# Patient Record
Sex: FEMALE | Race: WHITE | Employment: OTHER
[De-identification: names, ages, dates, MRNs, and addresses within clinical notes are randomized per-mention and may not be internally consistent; named-entity substitution may affect disease eponyms.]

---

## 2018-10-28 ENCOUNTER — HEALTH MAINTENANCE LETTER (OUTPATIENT)
Age: 63
End: 2018-10-28

## 2020-02-05 ENCOUNTER — HOSPITAL ENCOUNTER (EMERGENCY)
Facility: CLINIC | Age: 65
Discharge: HOME | End: 2020-02-05
Attending: EMERGENCY MEDICINE | Admitting: EMERGENCY MEDICINE
Payer: MEDICAID

## 2020-02-05 VITALS — OXYGEN SATURATION: 99 % | SYSTOLIC BLOOD PRESSURE: 140 MMHG | DIASTOLIC BLOOD PRESSURE: 88 MMHG | HEART RATE: 58 BPM

## 2020-02-05 DIAGNOSIS — N63.0 BREAST MASS: ICD-10-CM

## 2020-02-05 PROCEDURE — 99282 EMERGENCY DEPT VISIT SF MDM: CPT

## 2020-02-05 ASSESSMENT — ENCOUNTER SYMPTOMS: FEVER: 0

## 2020-02-05 NOTE — ED AVS SNAPSHOT
[facility] Emergency Department  [address]  [facility] [address]  Phone:  [phone]  Fax:  [phone]                                    Tricia Mcdermott   MRN: [id number]    Department:  [facility] Emergency Department   Date of Visit:  2/5/2020           After Visit Summary Signature Page    I have received my discharge instructions, and my questions have been answered. I have discussed any challenges I see with this plan with the nurse or doctor.    ..........................................................................................................................................  Patient/Patient Representative Signature      ..........................................................................................................................................  Patient Representative Print Name and Relationship to Patient    ..................................................               ................................................  Date                                   Time    ..........................................................................................................................................  Reviewed by Signature/Title    ...................................................              ..............................................  Date                                               Time          22EPIC Rev 08/18

## 2020-02-05 NOTE — CONSULTS
See ED note of 2/5    Indira Morgan RN, LICSW, CCM  RN Care Coordinator  [facility], Emergency Department  Phone  [phone]

## 2020-02-05 NOTE — ED NOTES
Care Coordinator - Discharge Planning    Admission Date/Time:  2/5/2020  Attending MD:  Magdalene Salazar MD     Data  Date of initial CC assessment:  2/5  Chart reviewed, discussed with interdisciplinary team.   Patient was admitted for:   1. Breast mass         Assessment   Concerns with insurance coverage for discharge needs: no insurance coverage.  Current Living Situation: Patient lives with spouse.  Support System: Supportive  Services Involved: none  Transportation at Discharge: Family or friend will provide    Barriers to Discharge: none identified      Coordination of Care and Referrals:  Pt needing screening for breast mass.   Uninsured and without primary care.     Contact made with the ERVIN program at the [place] [facility] ([phone]).  Pt qualifies to receive initial visit with PCP and subsequent eval/tx as indicated.    Pt and spouse were educated re: the ERVIN program and have elected to initiate care.        Plan  Anticipated Discharge Date:  2/5  Anticipated Discharge Plan:  Appointment made with a provider at the [facility] in [place], [place] ([phone]) for today at 1630 to initiate care through the ERVIN program.  Information provided to pt/SO verbally and on AVS.      Indira Morgan RN, LICSW, CCM  RN Care Coordinator  [facility], Emergency Department  Phone  [phone]

## 2020-02-05 NOTE — DISCHARGE INSTRUCTIONS
You have an appointment at the [facility] in [place] today at 4:30.   Please arrive at 4:15 to complete paperwork.   The clinic is located at [address] [place], [place] [address], [place] [address]  Phone # is [phone]

## 2020-02-05 NOTE — ED PROVIDER NOTES
History     Chief Complaint:  Lump on Breast    HPI   Tricia Mcdermott is a 64 year old female who presents with a lump on her left breast. The patient says that the cyst has been present since June and it has been getting bigger since then. She denies any fever, pain at the site, nipple drainage, or redness.     Allergies:  Bupropion      Medications:    Nicotrol      Past Medical History:    Tobacco use disorder     Past Surgical History:    Colonoscopy     Family History:    Uterine caner  Diabetes  Cancer     Social History:  The patient was accompanied to the ED by .  Smoking Status: Current Smoker  Smokeless Tobacco: Never Used  Alcohol Use: Negative   Drug Use: Negative  PCP: Eva Batista   Marital Status:       Review of Systems   Constitutional: Negative for fever.   Skin: Negative for rash.   All other systems reviewed and are negative.    Physical Exam     Patient Vitals for the past 24 hrs:   BP Pulse SpO2   02/05/20 1215 (!) 140/88 58 99 %   02/05/20 1200 (!) 149/87 60 97 %   02/05/20 1145 (!) 146/95 61 93 %        Physical Exam  General: Patient is alert and interactive when I enter the room  Head:  The scalp, face, and head appear normal  Eyes:  Conjunctivae are normal  ENT:    The nose is normal    Pinnae are normal    External acoustic canals are normal  Neck:  Trachea midline  CV:  Pulses are normal   Resp:  No respiratory distress   Breast:  Moderate sized mass in the right lower quadrant of right breast, no overlying skin changes such as puckering or erythema, no nipple drainage, no tenderness  Abdomen:      Soft, non-tender, non-distended  Musc:  Normal muscular tone    No major joint effusions    No asymmetric leg swelling  Skin:  No rash or lesions noted  Neuro:  Speech is normal and fluent. Face is symmetric.     Moving all extremities well.   Psych: Awake. Alert.  Normal affect.  Appropriate interactions.      Emergency Department Course       Emergency Department  Course:    1011 Nursing notes and vitals reviewed.    1026 I performed an exam of the patient as documented above.     1130 Patient rechecked and updated.       The patient is discharged to home.     Impression & Plan      Medical Decision Making:  Tricia Mcdermott is a 64 year old female who presents to the emergency department today for evaluation of rest mass.  This is been going on for several months and she was hoping to get it removed as her sisters had a similar situation and it was a cyst.  She reports is non-tender.  She does have a right breast mass that needs further evaluation.  Unfortunately we cannot do this evaluation in the ER.  No signs of abscess and I think this is a undifferentiated breast mass at this point.  I discussed with her about this.  Fortunately our care coordinator was able to set her up with a primary and they will refer her to a clinic that does breast imaging.  She has no insurance and so this was why she did not get this evaluated.  The care according to her discussed with her and was able to set up an appointment.  Patient discharged.        Diagnosis:    ICD-10-CM    1. Breast mass N63.0      Disposition:   Findings and plan explained to the Patient. Patient discharged home with instructions regarding supportive care, medications, and reasons to return. The importance of close follow-up was reviewed.     Discharge Medications:  Discharge Medication List as of 2/5/2020 12:19 PM          Scribe Disclosure:  I, Roman Nunez, am serving as a scribe at 10:16 AM on 2/5/2020 to document services personally performed by Magdalene Salazar MD based on my observations and the provider's statements to me.      [facility] EMERGENCY DEPARTMENT       Magdalene Salazar MD  02/06/20 0677

## 2021-03-12 ENCOUNTER — IMMUNIZATION (OUTPATIENT)
Dept: NURSING | Facility: CLINIC | Age: 66
End: 2021-03-12
Payer: MEDICARE

## 2021-03-12 PROCEDURE — 0001A PR COVID VAC PFIZER DIL RECON 30 MCG/0.3 ML IM: CPT

## 2021-03-12 PROCEDURE — 91300 PR COVID VAC PFIZER DIL RECON 30 MCG/0.3 ML IM: CPT

## 2021-04-02 ENCOUNTER — IMMUNIZATION (OUTPATIENT)
Dept: NURSING | Facility: CLINIC | Age: 66
End: 2021-04-02
Attending: INTERNAL MEDICINE
Payer: MEDICARE

## 2021-04-02 PROCEDURE — 91300 PR COVID VAC PFIZER DIL RECON 30 MCG/0.3 ML IM: CPT

## 2021-04-02 PROCEDURE — 0002A PR COVID VAC PFIZER DIL RECON 30 MCG/0.3 ML IM: CPT

## 2021-05-12 ENCOUNTER — TRANSCRIBE ORDERS (OUTPATIENT)
Facility: CLINIC | Age: 66
End: 2021-05-12

## 2021-05-12 DIAGNOSIS — J44.1 COPD EXACERBATION (H): Primary | ICD-10-CM

## 2021-05-27 ENCOUNTER — HOSPITAL ENCOUNTER (OUTPATIENT)
Dept: CARDIAC REHAB | Facility: CLINIC | Age: 66
End: 2021-05-27
Attending: INTERNAL MEDICINE
Payer: MEDICARE

## 2021-05-27 DIAGNOSIS — J44.1 COPD EXACERBATION (H): ICD-10-CM

## 2021-05-27 PROCEDURE — G0424 PULMONARY REHAB W EXER: HCPCS

## 2021-06-15 ENCOUNTER — HOSPITAL ENCOUNTER (OUTPATIENT)
Dept: CARDIAC REHAB | Facility: CLINIC | Age: 66
End: 2021-06-15
Attending: INTERNAL MEDICINE
Payer: MEDICARE

## 2021-06-15 PROCEDURE — G0424 PULMONARY REHAB W EXER: HCPCS

## 2021-06-18 ENCOUNTER — TELEPHONE (OUTPATIENT)
Dept: CARDIOLOGY | Facility: CLINIC | Age: 66
End: 2021-06-18

## 2021-06-18 NOTE — TELEPHONE ENCOUNTER
Pulmonary Rehabilitation note:    This visit was done as a virtual/telephone visit due to the COVID-19 pandemic.     The patient is currently participating in Pulmonary Rehabilitation, and demonstrates stable pulmonary response to exercise, making appropriate progress toward goals.    Overall feels well. She has found adjusting to her new lifestyle with dyspnea a challenge since she used to be very active, managing several stores etc, but overall she feels well. Denies any significant worsening in dyspnea. No specific complaints or concerns.     Barriers to participation include none.    I agree with this individual treatment plan and exercise prescription  See individual treatment plan for details of progress and plan.    Kush Easley MD, [facility]  Cardiology  Text Page   June 18, 2021

## 2021-06-29 ENCOUNTER — HOSPITAL ENCOUNTER (OUTPATIENT)
Dept: CARDIAC REHAB | Facility: CLINIC | Age: 66
End: 2021-06-29
Attending: INTERNAL MEDICINE
Payer: MEDICARE

## 2021-06-29 PROCEDURE — G0424 PULMONARY REHAB W EXER: HCPCS

## 2021-07-01 ENCOUNTER — HOSPITAL ENCOUNTER (OUTPATIENT)
Dept: CARDIAC REHAB | Facility: CLINIC | Age: 66
End: 2021-07-01
Attending: INTERNAL MEDICINE
Payer: MEDICARE

## 2021-07-01 PROCEDURE — G0424 PULMONARY REHAB W EXER: HCPCS

## 2021-07-06 ENCOUNTER — HOSPITAL ENCOUNTER (OUTPATIENT)
Dept: CARDIAC REHAB | Facility: CLINIC | Age: 66
End: 2021-07-06
Attending: INTERNAL MEDICINE
Payer: MEDICARE

## 2021-07-06 PROCEDURE — G0424 PULMONARY REHAB W EXER: HCPCS

## 2021-07-13 ENCOUNTER — HOSPITAL ENCOUNTER (OUTPATIENT)
Dept: CARDIAC REHAB | Facility: CLINIC | Age: 66
End: 2021-07-13
Attending: INTERNAL MEDICINE
Payer: MEDICARE

## 2021-07-13 PROCEDURE — G0424 PULMONARY REHAB W EXER: HCPCS | Performed by: REHABILITATION PRACTITIONER

## 2021-07-15 ENCOUNTER — HOSPITAL ENCOUNTER (OUTPATIENT)
Dept: CARDIAC REHAB | Facility: CLINIC | Age: 66
End: 2021-07-15
Attending: INTERNAL MEDICINE
Payer: MEDICARE

## 2021-07-15 PROCEDURE — G0424 PULMONARY REHAB W EXER: HCPCS | Performed by: REHABILITATION PRACTITIONER

## 2021-07-15 PROCEDURE — G0239 OTH RESP PROC, GROUP: HCPCS

## 2021-07-16 ENCOUNTER — EVALUATION (OUTPATIENT)
Dept: CARDIOLOGY | Facility: CLINIC | Age: 66
End: 2021-07-16

## 2021-07-16 NOTE — PROGRESS NOTES
I have personally talked to this patient today instead of in person due to global pandemic.  The patient is currently participating in Pulmonary Rehabilitation, and demonstrates stable pulmonary response to exercise, making appropriate progress toward goals.  Patient states that she is very grateful for the pulmonary rehab program as it has helped her motivate and gain function.  Otherwise, she think she would still be sitting at home lying in bed and not able to do very much.  Barriers to participation include none  I agree with this individual treatment plan and exercise prescription  See individual treatment plan for details of progress and plan.

## 2021-07-19 ENCOUNTER — HOSPITAL ENCOUNTER (OUTPATIENT)
Dept: CARDIAC REHAB | Facility: CLINIC | Age: 66
End: 2021-07-19
Attending: INTERNAL MEDICINE
Payer: MEDICARE

## 2021-07-19 PROCEDURE — G0424 PULMONARY REHAB W EXER: HCPCS | Performed by: REHABILITATION PRACTITIONER

## 2021-07-27 ENCOUNTER — HOSPITAL ENCOUNTER (OUTPATIENT)
Dept: CARDIAC REHAB | Facility: CLINIC | Age: 66
End: 2021-07-27
Attending: INTERNAL MEDICINE
Payer: MEDICARE

## 2021-07-27 PROCEDURE — G0424 PULMONARY REHAB W EXER: HCPCS | Performed by: REHABILITATION PRACTITIONER

## 2021-07-29 ENCOUNTER — HOSPITAL ENCOUNTER (OUTPATIENT)
Dept: CARDIAC REHAB | Facility: CLINIC | Age: 66
End: 2021-07-29
Attending: INTERNAL MEDICINE
Payer: MEDICARE

## 2021-07-29 PROCEDURE — G0424 PULMONARY REHAB W EXER: HCPCS | Performed by: REHABILITATION PRACTITIONER

## 2021-08-03 ENCOUNTER — HOSPITAL ENCOUNTER (OUTPATIENT)
Dept: CARDIAC REHAB | Facility: CLINIC | Age: 66
End: 2021-08-03
Attending: INTERNAL MEDICINE
Payer: MEDICARE

## 2021-08-03 PROCEDURE — G0424 PULMONARY REHAB W EXER: HCPCS

## 2021-08-05 ENCOUNTER — HOSPITAL ENCOUNTER (OUTPATIENT)
Dept: CARDIAC REHAB | Facility: CLINIC | Age: 66
End: 2021-08-05
Attending: INTERNAL MEDICINE
Payer: MEDICARE

## 2021-08-05 PROCEDURE — G0424 PULMONARY REHAB W EXER: HCPCS

## 2021-08-10 ENCOUNTER — HOSPITAL ENCOUNTER (OUTPATIENT)
Dept: CARDIAC REHAB | Facility: CLINIC | Age: 66
End: 2021-08-10
Attending: INTERNAL MEDICINE
Payer: MEDICARE

## 2021-08-10 PROCEDURE — G0424 PULMONARY REHAB W EXER: HCPCS

## 2021-08-12 ENCOUNTER — HOSPITAL ENCOUNTER (OUTPATIENT)
Dept: CARDIAC REHAB | Facility: CLINIC | Age: 66
End: 2021-08-12
Attending: INTERNAL MEDICINE
Payer: MEDICARE

## 2021-08-12 PROCEDURE — G0424 PULMONARY REHAB W EXER: HCPCS

## 2021-08-13 ENCOUNTER — EVALUATION (OUTPATIENT)
Dept: CARDIOLOGY | Facility: CLINIC | Age: 66
End: 2021-08-13

## 2021-08-13 NOTE — PROGRESS NOTES
I have personally talked to this patient yesterday instead of in person due to global pandemic.  The patient is currently participating in Pulmonary Rehabilitation, and demonstrates stable pulmonary response to exercise, making appropriate progress toward goals.  Patient states pulmonary rehabilitation going well and she is working hard.  Barriers to participation include none.  I agree with this individual treatment plan and exercise prescription  See individual treatment plan for details of progress and plan.

## 2021-08-17 ENCOUNTER — HOSPITAL ENCOUNTER (OUTPATIENT)
Dept: CARDIAC REHAB | Facility: CLINIC | Age: 66
End: 2021-08-17
Attending: INTERNAL MEDICINE
Payer: MEDICARE

## 2021-08-17 PROCEDURE — G0424 PULMONARY REHAB W EXER: HCPCS

## 2021-08-19 ENCOUNTER — HOSPITAL ENCOUNTER (OUTPATIENT)
Dept: CARDIAC REHAB | Facility: CLINIC | Age: 66
End: 2021-08-19
Attending: INTERNAL MEDICINE
Payer: MEDICARE

## 2021-08-19 PROCEDURE — G0424 PULMONARY REHAB W EXER: HCPCS

## 2021-08-24 ENCOUNTER — HOSPITAL ENCOUNTER (OUTPATIENT)
Dept: CARDIAC REHAB | Facility: CLINIC | Age: 66
End: 2021-08-24
Attending: INTERNAL MEDICINE
Payer: MEDICARE

## 2021-08-24 PROCEDURE — G0424 PULMONARY REHAB W EXER: HCPCS

## 2021-08-26 ENCOUNTER — HOSPITAL ENCOUNTER (OUTPATIENT)
Dept: CARDIAC REHAB | Facility: CLINIC | Age: 66
End: 2021-08-26
Attending: INTERNAL MEDICINE
Payer: MEDICARE

## 2021-08-26 PROCEDURE — G0424 PULMONARY REHAB W EXER: HCPCS

## 2021-08-30 ENCOUNTER — HOSPITAL ENCOUNTER (OUTPATIENT)
Dept: CARDIAC REHAB | Facility: CLINIC | Age: 66
End: 2021-08-30
Attending: INTERNAL MEDICINE
Payer: MEDICARE

## 2021-08-30 PROCEDURE — G0424 PULMONARY REHAB W EXER: HCPCS

## 2023-01-16 ENCOUNTER — NURSE TRIAGE (OUTPATIENT)
Dept: NURSING | Facility: CLINIC | Age: 68
End: 2023-01-16

## 2023-01-16 NOTE — TELEPHONE ENCOUNTER
is phoning stating that pt was seen at the dentist last week for some tooth pain     Pt is still having tooth pain on her lower left  that is radiating up to her left ear     Rates pain 10/10    No fever     Per Disposition : Go To Office Now    Pts provider is with [facility] and will be calling them to be seen today, if all appointments are booked for the day, [place] [facility] [place]     Pt states that they called her Dentist, but that they are closed and were directed to call [facility] triage     Care advice given per protocol and when to call back. Pt verbalized understanding and agrees to plan of care.    Cintia Walker RN  [facility] [facility] Nurse Advisor  9:18 AM 1/16/2023        Reason for Disposition    Face is swollen    Additional Information    Negative: Pale cold skin and very weak (can't stand)    Negative: Similar pain previously and it was from 'heart attack'    Negative: Similar pain previously and it was from 'angina' and not relieved by nitroglycerin    Negative: Sounds like a life-threatening emergency to the triager    Negative: Chest pain    Negative: Toothache followed tooth injury    Negative: Patient sounds very sick or weak to the triager    Protocols used: TOOTHACHE-A-OH